# Patient Record
(demographics unavailable — no encounter records)

---

## 2024-10-31 NOTE — HISTORY OF PRESENT ILLNESS
[FreeTextEntry1] : 66M  generally healthy PSA 0.57 decreased FOS increased hesitancy double voiding sensation of incomplete empty MRI abdomen 4/2022 simple left renal cyst no hem aturia no ED

## 2024-11-26 NOTE — HISTORY OF PRESENT ILLNESS
[FreeTextEntry1] : CLEMENT ASHLEY is a 66 year M presenting on 11/26/2024 PMH: BPH, HLD, migraine  BPH: tamsulosin 0.4, started 10/2024 for incomplete emptying, double voiding "Definite improvement in symptoms." No dizziness, no significant retrograde Still occasional double voiding. Happy with current regimen. PVR to r/o retention: 2 mL  No ED concerns at this time.  MRI abdomen 4/2022 simple left renal cyst  PSA 1.01, 11/2024 0.57, 8/2023 0.59, 2/2022 0.72, 12/2015

## 2024-12-03 NOTE — PHYSICAL EXAM
[No Acute Distress] : no acute distress [Well Nourished] : well nourished [Well Developed] : well developed [Well-Appearing] : well-appearing [Normal Sclera/Conjunctiva] : normal sclera/conjunctiva [PERRL] : pupils equal round and reactive to light [EOMI] : extraocular movements intact [Normal Outer Ear/Nose] : the outer ears and nose were normal in appearance [Normal Oropharynx] : the oropharynx was normal [No JVD] : no jugular venous distention [No Lymphadenopathy] : no lymphadenopathy [Supple] : supple [Thyroid Normal, No Nodules] : the thyroid was normal and there were no nodules present [No Respiratory Distress] : no respiratory distress  [No Accessory Muscle Use] : no accessory muscle use [Clear to Auscultation] : lungs were clear to auscultation bilaterally [Normal Rate] : normal rate  [Regular Rhythm] : with a regular rhythm [Normal S1, S2] : normal S1 and S2 [No Murmur] : no murmur heard [No Carotid Bruits] : no carotid bruits [No Abdominal Bruit] : a ~M bruit was not heard ~T in the abdomen [No Varicosities] : no varicosities [Pedal Pulses Present] : the pedal pulses are present [No Edema] : there was no peripheral edema [No Palpable Aorta] : no palpable aorta [No Extremity Clubbing/Cyanosis] : no extremity clubbing/cyanosis [Soft] : abdomen soft [Non Tender] : non-tender [Non-distended] : non-distended [No Masses] : no abdominal mass palpated [No HSM] : no HSM [Normal Bowel Sounds] : normal bowel sounds [Normal Posterior Cervical Nodes] : no posterior cervical lymphadenopathy [Normal Anterior Cervical Nodes] : no anterior cervical lymphadenopathy [No CVA Tenderness] : no CVA  tenderness [No Spinal Tenderness] : no spinal tenderness [No Joint Swelling] : no joint swelling [Grossly Normal Strength/Tone] : grossly normal strength/tone [No Rash] : no rash [Coordination Grossly Intact] : coordination grossly intact [No Focal Deficits] : no focal deficits [Normal Gait] : normal gait [Deep Tendon Reflexes (DTR)] : deep tendon reflexes were 2+ and symmetric [Normal Affect] : the affect was normal [Normal Insight/Judgement] : insight and judgment were intact [Declined Rectal Exam] : declined rectal exam [Alert and Oriented x3] : oriented to person, place, and time

## 2024-12-03 NOTE — CURRENT MEDS
[Takes medication as prescribed] : takes [None] : Patient does not have any barriers to medication adherence [Yes] : Reviewed medication list for presence of high-risk medications. hypoglycemia protocol

## 2024-12-03 NOTE — HISTORY OF PRESENT ILLNESS
[de-identified] : 67 y/o man presents for CPE. UTD with HCM-vision, dentist, CRC screen, audiologist.

## 2024-12-03 NOTE — HEALTH RISK ASSESSMENT
[Good] : ~his/her~  mood as  good [Yes] : Yes [2 - 4 times a month (2 pts)] : 2-4 times a month (2 points) [1 or 2 (0 pts)] : 1 or 2 (0 points) [Never (0 pts)] : Never (0 points) [No] : In the past 12 months have you used drugs other than those required for medical reasons? No [No falls in past year] : Patient reported no falls in the past year [0] : 2) Feeling down, depressed, or hopeless: Not at all (0) [PHQ-2 Negative - No further assessment needed] : PHQ-2 Negative - No further assessment needed [Audit-CScore] : 2 [LEN2Egnqt] : 0 [Former] : Former [5-9] : 5-9 [> 15 Years] : > 15 Years [HIV test declined] : HIV test declined [Hepatitis C test declined] : Hepatitis C test declined [Change in mental status noted] : No change in mental status noted [Language] : denies difficulty with language [Behavior] : denies difficulty with behavior [Learning/Retaining New Information] : denies difficulty learning/retaining new information [Handling Complex Tasks] : denies difficulty handling complex tasks [Reasoning] : denies difficulty with reasoning [Spatial Ability and Orientation] : denies difficulty with spatial ability and orientation [None] : None [With Family] : lives with family [] :  [Sexually Active] : sexually active [Feels Safe at Home] : Feels safe at home [Fully functional (bathing, dressing, toileting, transferring, walking, feeding)] : Fully functional (bathing, dressing, toileting, transferring, walking, feeding) [Fully functional (using the telephone, shopping, preparing meals, housekeeping, doing laundry, using] : Fully functional and needs no help or supervision to perform IADLs (using the telephone, shopping, preparing meals, housekeeping, doing laundry, using transportation, managing medications and managing finances) [Reports changes in hearing] : Reports no changes in hearing [Reports changes in vision] : Reports no changes in vision [Reports changes in dental health] : Reports no changes in dental health [Smoke Detector] : smoke detector [Safety elements used in home] : safety elements used in home [Seat Belt] :  uses seat belt

## 2024-12-03 NOTE — ASSESSMENT
[FreeTextEntry1] : 66 year is here for a CPE. He was counselled on diet and exercise, drug and alcohol use, age appropriate health care maintenance including vaccines, seatbelts, sunscreen, stress reduction and safe sex. All questions asked/answered to the best of my ability. Labs sent.  Prevnar and flu vaccines given. AAA screen.

## 2024-12-16 NOTE — HISTORY OF PRESENT ILLNESS
[Home] : at home, [unfilled] , at the time of the visit. [Medical Office: ([facility])___] : at the medical office located in  [Verbal consent obtained from patient] : the patient, [unfilled] [de-identified] : Presents to review options for hyperlipidemia management.  "Many years ago took simvastatin and it erased [his] memory". Willing to try different statin. Wants to do Calcium score but not until the new year which is fine.  Plan: -start rosuvastatin and CoQ10. -Calcium score, AAA screen.

## 2025-03-03 NOTE — ASSESSMENT
[FreeTextEntry1] : Patient is a 66 year old gentleman with history of HLD, BPH, VIt D deficiency, MVP who presents for evaluation of symptomatic right inguinal hernia. He reports noticing right inguinal pain and discomfort associated with bulge that has become progressively more noticeable and symptomatic over recent years. Denies fevers, chills, chest pain, dyspnea, dysuria, hematochezia, melena. At time of evaluation, afebrile, hemodynamically stable, abdomen soft, nontender, nondistended, reducible right inguinal hernia appreciated.  In summary, Mr. CLEMENT ASHLEY has a symptomatic inguinal hernia for which I recommend surgical repair at the patient's earliest convenience. I discussed the risks and benefits including, but not limited to, injury to intra-abdominal organs (bowel, bladder, nerves, spermatic cord/round ligament), bleeding, infection, use of mesh and recurrent hernia, and urinary retention. We discussed the role and complications of mesh at length. I also discussed the normal yasir- and post-operative course and the possibility of postoperative chronic pain. I answered all questions about this surgery and possible complications to the best of my ability and the patient verbalized understanding. Should unrelenting symptoms or signs of incarceration develop prior to surgery the patient knows to call or go to the emergency room.  Plan for robotic assisted right inguinal hernia repair with mesh Pre operative risk assessment  I, Dr. Esau Cain, spent 50 minutes with the patient >50% counseling/coordination of care including, reviewing the patient's history, performing an examination, reviewing relevant labs and radiographic imaging, reviewing PCP and consultant notes, discussion of medical and surgical management of the diagnosis as well as associated risks and benefits, and completing documentation.

## 2025-03-03 NOTE — HISTORY OF PRESENT ILLNESS
[de-identified] : Patient is a 66 year old gentleman with history of HLD, BPH, VIt D deficiency, MVP who presents for evaluation of symptomatic right inguinal hernia. He reports noticing right inguinal pain and discomfort associated with bulge that has become progressively more noticeable and symptomatic over recent years. Denies fevers, chills, chest pain, dyspnea, dysuria, hematochezia, melena. At time of evaluation, afebrile, hemodynamically stable, abdomen soft, nontender, nondistended, reducible right inguinal hernia appreciated.

## 2025-03-31 NOTE — HISTORY OF PRESENT ILLNESS
[No Pertinent Cardiac History] : no history of aortic stenosis, atrial fibrillation, coronary artery disease, recent myocardial infarction, or implantable device/pacemaker [No Pertinent Pulmonary History] : no history of asthma, COPD, sleep apnea, or smoking [No Adverse Anesthesia Reaction] : no adverse anesthesia reaction in self or family member [(Patient denies any chest pain, claudication, dyspnea on exertion, orthopnea, palpitations or syncope)] : Patient denies any chest pain, claudication, dyspnea on exertion, orthopnea, palpitations or syncope [Excellent (>10 METs)] : Excellent (>10 METs) [Chronic Anticoagulation] : no chronic anticoagulation [Chronic Kidney Disease] : no chronic kidney disease [Diabetes] : no diabetes [FreeTextEntry4] : Pt with HLD presents for preoperative clearance prior to laparoscopic hernia repair. Generally feels well. Only issue continues to be management of his HLD. Recently started on Rosuvastatin (low dose). Had been on simvastatin years ago and suffered from severe  brain fog. Has similar symptoms now but this is not as bad. Willing to stay on through the surgery.

## 2025-03-31 NOTE — ASSESSMENT
[Patient Optimized for Surgery] : Patient optimized for surgery [No Further Testing Recommended] : no further testing recommended [Continue medications as is] : Continue current medications [As per surgery] : as per surgery [FreeTextEntry4] : Pt to be cleared to proceed pending normal labs/CXR. 7 minutes were spent adminsitering an evidence based 10 year ASCVD Risk Assessment tool showing patient's risk being 16% (intermediate) and discussing results with patient including lifestyle modificationms to be taken.  Will obtain calcium score (after procedure) and decide on need for statin at that time but he is amenable to staying on to reduce CV surgical risk.

## 2025-04-30 NOTE — HISTORY OF PRESENT ILLNESS
[de-identified] : Pt is a 68 y/o M 2 weeks s/p robotic assisted laparoscopic right inguinal hernia repair with mesh.  Pt denies any fevers, chest pn, sob, calf pain, n,v,c,d, acid reflux, or abd pn since sx. Pt states he has resumed his usual diet and home activities without difficulty and is walking daily for exercise. Pt understands we do not recommend heavy lifting over 10 lbs x 6 weeks after sx.

## 2025-04-30 NOTE — PHYSICAL EXAM
[de-identified] : incisions healing well, dermabond intact. no evidence of bleeding, oozing, or infection

## 2025-04-30 NOTE — ASSESSMENT
[FreeTextEntry1] : Pt is a 68 y/o M 2 weeks s/p robotic assisted laparoscopic right inguinal hernia repair with mesh.  Pt denies any fevers, chest pn, sob, calf pain, n,v,c,d, acid reflux, or abd pn since sx. Pt states he has resumed his usual diet and home activities without difficulty and is walking daily for exercise. Pt understands we do not recommend heavy lifting over 10 lbs x 6 weeks after sx.  I, Dr. Esau Cain personally performed the evaluation and management (E/M) services for this patient. That E/M includes conducting the clinically appropriate initial history &/or exam, assessing all conditions, and establishing the plan of care. Today, my PA, Cassie Ribeiro, was present during my evaluation and management service for this patient and assisted in scribing the encounter and was here to observe my E/M service for this patient and follow plan of care established by me going forward.

## 2025-05-28 NOTE — HISTORY OF PRESENT ILLNESS
[de-identified] : PATIENT COMPLAINS OF RIGHT SHOUDLER RHD PAIN BEGAN 10 YEARS AGO PAIN   5 /10  DESCRIPTION OF PAIN: SHARP WORSE AT PARTICULAR TIME OF DAY: WORSE AT NIGHT, SLEEPING PRIOR TREATMENT: REST   PREVIOUS DISLOCATION:NO HAS HAD PREVIOUS IMAGING - NO HAS HAD PHYSICAL THERAPY WITHOUT RELIEF-NO HAS HAD PREVIOUS SURGERY-  NO HAS HAD PREVIOUS INJECTION .-  NO

## 2025-05-28 NOTE — PHYSICAL EXAM
[de-identified] : PHYSICAL EXAM  RIGHT  SHOULDER  NECK EXAM  FROM NONTENDER  SPURLING  RIGHT=NEG, LEFT=NEG  NORMAL POSTURE / SCAPULAR PROTRACTION AROM 140 / 140 / 90 / 30 TENDER: SA REGION / AC JOINT / GH JOINT / BICEPS GROOVE  SPECIAL TESTING : HORNE - POSITIVE  DANICA - POSITIVE  SPEED TEST - POSITIVE  COON - NEGATIVE  APPREHENSION AND SUPPRESSION - NEGATIVE   RC STRENGTH TESTING  SS:  5/5 SUB 5/5 IS     5/5 BICEPS  5/5  SENSATION  - GROSSLY INTACT   [de-identified] : I ORDERED XRAYS OF SHOULDER TO EVALUATE PAINFUL SYMPTOMS  RIGHT SHOULDER XRAY (2 VIEWS - AP AND OUTLET) -  NO OBVIOUS FRACTURE ,  SEPARATION OR DISLOCATION NO SIGNIFICANT OSTEOARTHRITIS, TYPE 3B ACROMION + SPUR [id number]

## 2025-06-19 NOTE — PHYSICAL EXAM
[de-identified] : PHYSICAL EXAM  RIGHT  SHOULDER    NORMAL POSTURE  AROM 125 / 125 / 70 / 20 TENDER: SA REGION  SPECIAL TESTING : HORNE - POSITIVE  DANICA - POSITIVE  SPEED TEST - POSITIVE  COON - NEGATIVE  APPREHENSION AND SUPPRESSION - NEGATIVE   RC STRENGTH TESTING  SS:  5/5 SUB 5/5 IS     5/5 BICEPS  5/5  SENSATION  - GROSSLY INTACT   [de-identified] : Date of Exam: 05-   EXAM:  MRI RIGHT SHOULDER WITHOUT CONTRAST  HISTORY: Shoulder pain.  TECHNIQUE:  Multiplanar, multi-sequence MRI of the right shoulder was performed on a 3T scanner according to standard protocol without administration of IV contrast.   IMPRESSION:  MRI of the right shoulder demonstrates:   1.  Supraspinatus tendinosis with high-grade partial-thickness bursal surface tear of the central fibers and low-grade interstitial delaminating tear. 2.  Infraspinatus tendinosis with low-grade interstitial delaminating tear and sentinel cyst at myotendinous junction. 3.  Subscapularis tendinosis without tear. 4.  Mild glenohumeral joint osteoarthritis. 5.  Mild acromioclavicular joint osteoarthritis with downsloping acromion and subacromial spur, which may contribute to impingement. 6.  Mild subacromial-subdeltoid bursitis.

## 2025-06-19 NOTE — HISTORY OF PRESENT ILLNESS
[de-identified] : PATIENT IS HERE FOR TODAY FOR  RIGHT SHOULDER FOLLOW UP MRI REVIEW PAIN LEVEL:3 /10     PREVIOUS HPI PATIENT COMPLAINS OF RIGHT SHOUDLER RHD PAIN BEGAN 10 YEARS AGO PAIN   5 /10  DESCRIPTION OF PAIN: SHARP WORSE AT PARTICULAR TIME OF DAY: WORSE AT NIGHT, SLEEPING PRIOR TREATMENT: REST   PREVIOUS DISLOCATION:NO HAS HAD PREVIOUS IMAGING - NO HAS HAD PHYSICAL THERAPY WITHOUT RELIEF-NO HAS HAD PREVIOUS SURGERY-  NO HAS HAD PREVIOUS INJECTION .-  NO

## 2025-07-24 NOTE — HISTORY OF PRESENT ILLNESS
[FreeTextEntry8] : P/w ~ 1 week of pinching burning pain in anterior/lateral thigh. No muscle pain. No weakness. No rash. No bruise.

## 2025-07-24 NOTE — ASSESSMENT
[FreeTextEntry1] : Pt with lateral femoral cutaneous nerve sybdrome. Advised on benign and self limited nature of condition. Avoid triggers. Can use NSAIDs. If it gets worse, can consider Neurontin.

## 2025-07-24 NOTE — ASSESSMENT
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 Given print out. NSAIDs PRN. Can consider Neurontin if it become smore bothersome.